# Patient Record
Sex: MALE | Race: BLACK OR AFRICAN AMERICAN | NOT HISPANIC OR LATINO | URBAN - METROPOLITAN AREA
[De-identification: names, ages, dates, MRNs, and addresses within clinical notes are randomized per-mention and may not be internally consistent; named-entity substitution may affect disease eponyms.]

---

## 2022-06-25 ENCOUNTER — INPATIENT (INPATIENT)
Facility: HOSPITAL | Age: 22
LOS: 1 days | Discharge: HOME | End: 2022-06-27
Attending: PLASTIC SURGERY | Admitting: PLASTIC SURGERY

## 2022-06-25 VITALS
DIASTOLIC BLOOD PRESSURE: 74 MMHG | OXYGEN SATURATION: 100 % | TEMPERATURE: 98 F | RESPIRATION RATE: 18 BRPM | SYSTOLIC BLOOD PRESSURE: 136 MMHG | WEIGHT: 130.07 LBS | HEART RATE: 84 BPM

## 2022-06-25 LAB
ALBUMIN SERPL ELPH-MCNC: 5.2 G/DL — SIGNIFICANT CHANGE UP (ref 3.5–5.2)
ALP SERPL-CCNC: 73 U/L — SIGNIFICANT CHANGE UP (ref 30–115)
ALT FLD-CCNC: 12 U/L — SIGNIFICANT CHANGE UP (ref 0–41)
ANION GAP SERPL CALC-SCNC: 14 MMOL/L — SIGNIFICANT CHANGE UP (ref 7–14)
AST SERPL-CCNC: 25 U/L — SIGNIFICANT CHANGE UP (ref 0–41)
BASOPHILS # BLD AUTO: 0.05 K/UL — SIGNIFICANT CHANGE UP (ref 0–0.2)
BASOPHILS NFR BLD AUTO: 0.5 % — SIGNIFICANT CHANGE UP (ref 0–1)
BILIRUB SERPL-MCNC: 0.4 MG/DL — SIGNIFICANT CHANGE UP (ref 0.2–1.2)
BUN SERPL-MCNC: 10 MG/DL — SIGNIFICANT CHANGE UP (ref 10–20)
CALCIUM SERPL-MCNC: 9.8 MG/DL — SIGNIFICANT CHANGE UP (ref 8.5–10.1)
CHLORIDE SERPL-SCNC: 100 MMOL/L — SIGNIFICANT CHANGE UP (ref 98–110)
CO2 SERPL-SCNC: 26 MMOL/L — SIGNIFICANT CHANGE UP (ref 17–32)
CREAT SERPL-MCNC: 0.8 MG/DL — SIGNIFICANT CHANGE UP (ref 0.7–1.5)
EGFR: 128 ML/MIN/1.73M2 — SIGNIFICANT CHANGE UP
EOSINOPHIL # BLD AUTO: 0.01 K/UL — SIGNIFICANT CHANGE UP (ref 0–0.7)
EOSINOPHIL NFR BLD AUTO: 0.1 % — SIGNIFICANT CHANGE UP (ref 0–8)
GLUCOSE SERPL-MCNC: 93 MG/DL — SIGNIFICANT CHANGE UP (ref 70–99)
HCT VFR BLD CALC: 42.5 % — SIGNIFICANT CHANGE UP (ref 42–52)
HGB BLD-MCNC: 12.9 G/DL — LOW (ref 14–18)
IMM GRANULOCYTES NFR BLD AUTO: 0.2 % — SIGNIFICANT CHANGE UP (ref 0.1–0.3)
LYMPHOCYTES # BLD AUTO: 2.84 K/UL — SIGNIFICANT CHANGE UP (ref 1.2–3.4)
LYMPHOCYTES # BLD AUTO: 25.7 % — SIGNIFICANT CHANGE UP (ref 20.5–51.1)
MCHC RBC-ENTMCNC: 21.7 PG — LOW (ref 27–31)
MCHC RBC-ENTMCNC: 30.4 G/DL — LOW (ref 32–37)
MCV RBC AUTO: 71.5 FL — LOW (ref 80–94)
MONOCYTES # BLD AUTO: 0.55 K/UL — SIGNIFICANT CHANGE UP (ref 0.1–0.6)
MONOCYTES NFR BLD AUTO: 5 % — SIGNIFICANT CHANGE UP (ref 1.7–9.3)
NEUTROPHILS # BLD AUTO: 7.6 K/UL — HIGH (ref 1.4–6.5)
NEUTROPHILS NFR BLD AUTO: 68.5 % — SIGNIFICANT CHANGE UP (ref 42.2–75.2)
NRBC # BLD: 0 /100 WBCS — SIGNIFICANT CHANGE UP (ref 0–0)
PLATELET # BLD AUTO: 212 K/UL — SIGNIFICANT CHANGE UP (ref 130–400)
POTASSIUM SERPL-MCNC: 4.1 MMOL/L — SIGNIFICANT CHANGE UP (ref 3.5–5)
POTASSIUM SERPL-SCNC: 4.1 MMOL/L — SIGNIFICANT CHANGE UP (ref 3.5–5)
PROT SERPL-MCNC: 7.5 G/DL — SIGNIFICANT CHANGE UP (ref 6–8)
RBC # BLD: 5.94 M/UL — SIGNIFICANT CHANGE UP (ref 4.7–6.1)
RBC # FLD: 14.6 % — HIGH (ref 11.5–14.5)
SARS-COV-2 RNA SPEC QL NAA+PROBE: SIGNIFICANT CHANGE UP
SODIUM SERPL-SCNC: 140 MMOL/L — SIGNIFICANT CHANGE UP (ref 135–146)
WBC # BLD: 11.07 K/UL — HIGH (ref 4.8–10.8)
WBC # FLD AUTO: 11.07 K/UL — HIGH (ref 4.8–10.8)

## 2022-06-25 PROCEDURE — 99232 SBSQ HOSP IP/OBS MODERATE 35: CPT | Mod: 25

## 2022-06-25 PROCEDURE — 99283 EMERGENCY DEPT VISIT LOW MDM: CPT

## 2022-06-25 PROCEDURE — 93010 ELECTROCARDIOGRAM REPORT: CPT

## 2022-06-25 PROCEDURE — 16020 DRESS/DEBRID P-THICK BURN S: CPT

## 2022-06-25 RX ORDER — NAFCILLIN 10 G/100ML
1 INJECTION, POWDER, FOR SOLUTION INTRAVENOUS EVERY 6 HOURS
Refills: 0 | Status: DISCONTINUED | OUTPATIENT
Start: 2022-06-25 | End: 2022-06-27

## 2022-06-25 RX ORDER — OFLOXACIN 0.3 %
1 DROPS OPHTHALMIC (EYE) ONCE
Refills: 0 | Status: COMPLETED | OUTPATIENT
Start: 2022-06-25 | End: 2022-06-25

## 2022-06-25 RX ORDER — INFLUENZA VIRUS VACCINE 15; 15; 15; 15 UG/.5ML; UG/.5ML; UG/.5ML; UG/.5ML
0.5 SUSPENSION INTRAMUSCULAR ONCE
Refills: 0 | Status: DISCONTINUED | OUTPATIENT
Start: 2022-06-25 | End: 2022-06-27

## 2022-06-25 RX ORDER — ACETAMINOPHEN 500 MG
650 TABLET ORAL EVERY 6 HOURS
Refills: 0 | Status: DISCONTINUED | OUTPATIENT
Start: 2022-06-25 | End: 2022-06-27

## 2022-06-25 RX ORDER — NAFCILLIN 10 G/100ML
1 INJECTION, POWDER, FOR SOLUTION INTRAVENOUS ONCE
Refills: 0 | Status: COMPLETED | OUTPATIENT
Start: 2022-06-25 | End: 2022-06-25

## 2022-06-25 RX ORDER — PREDNISOLONE SODIUM PHOSPHATE 1 %
1 DROPS OPHTHALMIC (EYE)
Refills: 0 | Status: DISCONTINUED | OUTPATIENT
Start: 2022-06-25 | End: 2022-06-27

## 2022-06-25 RX ORDER — ERYTHROMYCIN BASE 5 MG/GRAM
1 OINTMENT (GRAM) OPHTHALMIC (EYE) THREE TIMES A DAY
Refills: 0 | Status: DISCONTINUED | OUTPATIENT
Start: 2022-06-25 | End: 2022-06-27

## 2022-06-25 RX ORDER — ENOXAPARIN SODIUM 100 MG/ML
40 INJECTION SUBCUTANEOUS EVERY 24 HOURS
Refills: 0 | Status: DISCONTINUED | OUTPATIENT
Start: 2022-06-25 | End: 2022-06-27

## 2022-06-25 RX ORDER — PANTOPRAZOLE SODIUM 20 MG/1
40 TABLET, DELAYED RELEASE ORAL
Refills: 0 | Status: DISCONTINUED | OUTPATIENT
Start: 2022-06-25 | End: 2022-06-27

## 2022-06-25 RX ORDER — SODIUM CHLORIDE 9 MG/ML
1000 INJECTION, SOLUTION INTRAVENOUS
Refills: 0 | Status: DISCONTINUED | OUTPATIENT
Start: 2022-06-25 | End: 2022-06-25

## 2022-06-25 RX ORDER — OFLOXACIN 0.3 %
1 DROPS OPHTHALMIC (EYE)
Refills: 0 | Status: DISCONTINUED | OUTPATIENT
Start: 2022-06-25 | End: 2022-06-27

## 2022-06-25 RX ORDER — NAFCILLIN 10 G/100ML
INJECTION, POWDER, FOR SOLUTION INTRAVENOUS
Refills: 0 | Status: DISCONTINUED | OUTPATIENT
Start: 2022-06-25 | End: 2022-06-27

## 2022-06-25 RX ORDER — BACITRACIN ZINC AND POLYMYXIN B SULFATE 500; 10000 [USP'U]/G; [USP'U]/G
1 OINTMENT OPHTHALMIC EVERY 6 HOURS
Refills: 0 | Status: DISCONTINUED | OUTPATIENT
Start: 2022-06-25 | End: 2022-06-27

## 2022-06-25 RX ORDER — KETOROLAC TROMETHAMINE 30 MG/ML
15 SYRINGE (ML) INJECTION EVERY 6 HOURS
Refills: 0 | Status: DISCONTINUED | OUTPATIENT
Start: 2022-06-25 | End: 2022-06-27

## 2022-06-25 RX ORDER — ERYTHROMYCIN BASE 5 MG/GRAM
1 OINTMENT (GRAM) OPHTHALMIC (EYE) EVERY 6 HOURS
Refills: 0 | Status: DISCONTINUED | OUTPATIENT
Start: 2022-06-25 | End: 2022-06-25

## 2022-06-25 RX ADMIN — Medication 1 APPLICATION(S): at 21:53

## 2022-06-25 RX ADMIN — Medication 1 DROP(S): at 15:20

## 2022-06-25 RX ADMIN — PANTOPRAZOLE SODIUM 40 MILLIGRAM(S): 20 TABLET, DELAYED RELEASE ORAL at 07:26

## 2022-06-25 RX ADMIN — Medication 1 DROP(S): at 20:16

## 2022-06-25 RX ADMIN — Medication 1 DROP(S): at 17:02

## 2022-06-25 RX ADMIN — Medication 1 DROP(S): at 06:10

## 2022-06-25 RX ADMIN — Medication 1 APPLICATION(S): at 21:58

## 2022-06-25 RX ADMIN — NAFCILLIN 100 GRAM(S): 10 INJECTION, POWDER, FOR SOLUTION INTRAVENOUS at 17:05

## 2022-06-25 RX ADMIN — Medication 1 DROP(S): at 11:03

## 2022-06-25 RX ADMIN — Medication 1 DROP(S): at 21:58

## 2022-06-25 RX ADMIN — BACITRACIN ZINC AND POLYMYXIN B SULFATE 1 APPLICATION(S): 500; 10000 OINTMENT OPHTHALMIC at 12:43

## 2022-06-25 RX ADMIN — Medication 1 DROP(S): at 02:30

## 2022-06-25 RX ADMIN — BACITRACIN ZINC AND POLYMYXIN B SULFATE 1 APPLICATION(S): 500; 10000 OINTMENT OPHTHALMIC at 06:10

## 2022-06-25 RX ADMIN — Medication 1 DROP(S): at 01:51

## 2022-06-25 RX ADMIN — Medication 1 DROP(S): at 09:44

## 2022-06-25 RX ADMIN — Medication 1 DROP(S): at 12:42

## 2022-06-25 RX ADMIN — NAFCILLIN 100 GRAM(S): 10 INJECTION, POWDER, FOR SOLUTION INTRAVENOUS at 11:00

## 2022-06-25 RX ADMIN — Medication 1 DROP(S): at 06:16

## 2022-06-25 RX ADMIN — NAFCILLIN 100 GRAM(S): 10 INJECTION, POWDER, FOR SOLUTION INTRAVENOUS at 03:39

## 2022-06-25 RX ADMIN — Medication 1 APPLICATION(S): at 11:01

## 2022-06-25 RX ADMIN — Medication 1 DROP(S): at 04:00

## 2022-06-25 RX ADMIN — ENOXAPARIN SODIUM 40 MILLIGRAM(S): 100 INJECTION SUBCUTANEOUS at 12:42

## 2022-06-25 RX ADMIN — Medication 1 APPLICATION(S): at 18:39

## 2022-06-25 RX ADMIN — Medication 1 DROP(S): at 11:00

## 2022-06-25 RX ADMIN — Medication 1 APPLICATION(S): at 06:32

## 2022-06-25 RX ADMIN — Medication 1 APPLICATION(S): at 15:20

## 2022-06-25 RX ADMIN — NAFCILLIN 100 GRAM(S): 10 INJECTION, POWDER, FOR SOLUTION INTRAVENOUS at 21:55

## 2022-06-25 RX ADMIN — Medication 1 DROP(S): at 18:37

## 2022-06-25 RX ADMIN — Medication 1 APPLICATION(S): at 09:00

## 2022-06-25 RX ADMIN — Medication 1 DROP(S): at 18:26

## 2022-06-25 RX ADMIN — BACITRACIN ZINC AND POLYMYXIN B SULFATE 1 APPLICATION(S): 500; 10000 OINTMENT OPHTHALMIC at 18:38

## 2022-06-25 RX ADMIN — SODIUM CHLORIDE 75 MILLILITER(S): 9 INJECTION, SOLUTION INTRAVENOUS at 02:36

## 2022-06-25 NOTE — PATIENT PROFILE ADULT - FALL HARM RISK - HARM RISK INTERVENTIONS
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
## 2022-06-25 NOTE — ED PROVIDER NOTE - NS ED ROS FT
Review of Systems:  	•	CONSTITUTIONAL - no fever, no diaphoresis, no chills  	•	SKIN - +burn, no rash  	•	HEMATOLOGIC - no bleeding, no bruising  	•	EYES - no eye pain, no blurry vision  	•	ENT - no congestion  	•	RESPIRATORY - no shortness of breath, no cough  	•	CARDIAC - no chest pain, no palpitations  	•	GI - no abd pain, no nausea, no vomiting, no diarrhea, no constipation  	•	GENITO-URINARY - no dysuria; no hematuria, no increased urinary frequency  	•	MUSCULOSKELETAL - no joint paint, no swelling, no redness  	•	NEUROLOGIC - no weakness, no headache, no paresthesias, no LOC  	•	PSYCH - no anxiety, no depression  	All other ROS are negative except as documented in HPI.

## 2022-06-25 NOTE — H&P ADULT - NSHPLABSRESULTS_GEN_ALL_CORE
Vital Signs Last 24 Hrs  T(C): 36.8 (25 Jun 2022 00:34), Max: 36.8 (25 Jun 2022 00:34)  T(F): 98.2 (25 Jun 2022 00:34), Max: 98.2 (25 Jun 2022 00:34)  HR: 84 (25 Jun 2022 00:34) (84 - 84)  BP: 136/74 (25 Jun 2022 00:34) (136/74 - 136/74)  RR: 18 (25 Jun 2022 00:34) (18 - 18)  SpO2: 100% (25 Jun 2022 00:34) (100% - 100%)
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
## 2022-06-26 ENCOUNTER — TRANSCRIPTION ENCOUNTER (OUTPATIENT)
Age: 22
End: 2022-06-26

## 2022-06-26 LAB
CK MB CFR SERPL CALC: 1.2 NG/ML — SIGNIFICANT CHANGE UP (ref 0.6–6.3)
CK MB CFR SERPL CALC: 1.3 NG/ML — SIGNIFICANT CHANGE UP (ref 0.6–6.3)
CK SERPL-CCNC: 155 U/L — SIGNIFICANT CHANGE UP (ref 0–225)
CK SERPL-CCNC: 173 U/L — SIGNIFICANT CHANGE UP (ref 0–225)
TROPONIN T SERPL-MCNC: <0.01 NG/ML — SIGNIFICANT CHANGE UP
TROPONIN T SERPL-MCNC: <0.01 NG/ML — SIGNIFICANT CHANGE UP

## 2022-06-26 PROCEDURE — 99232 SBSQ HOSP IP/OBS MODERATE 35: CPT

## 2022-06-26 RX ADMIN — Medication 1 DROP(S): at 10:48

## 2022-06-26 RX ADMIN — Medication 1 DROP(S): at 22:32

## 2022-06-26 RX ADMIN — BACITRACIN ZINC AND POLYMYXIN B SULFATE 1 APPLICATION(S): 500; 10000 OINTMENT OPHTHALMIC at 18:32

## 2022-06-26 RX ADMIN — Medication 1 DROP(S): at 12:12

## 2022-06-26 RX ADMIN — Medication 1 DROP(S): at 16:05

## 2022-06-26 RX ADMIN — Medication 1 DROP(S): at 18:31

## 2022-06-26 RX ADMIN — Medication 1 DROP(S): at 22:31

## 2022-06-26 RX ADMIN — Medication 1 DROP(S): at 12:13

## 2022-06-26 RX ADMIN — NAFCILLIN 100 GRAM(S): 10 INJECTION, POWDER, FOR SOLUTION INTRAVENOUS at 22:30

## 2022-06-26 RX ADMIN — Medication 1 DROP(S): at 10:49

## 2022-06-26 RX ADMIN — Medication 1 DROP(S): at 08:12

## 2022-06-26 RX ADMIN — NAFCILLIN 100 GRAM(S): 10 INJECTION, POWDER, FOR SOLUTION INTRAVENOUS at 12:10

## 2022-06-26 RX ADMIN — Medication 1 APPLICATION(S): at 22:31

## 2022-06-26 RX ADMIN — Medication 1 DROP(S): at 13:43

## 2022-06-26 RX ADMIN — ENOXAPARIN SODIUM 40 MILLIGRAM(S): 100 INJECTION SUBCUTANEOUS at 12:11

## 2022-06-26 RX ADMIN — Medication 1 APPLICATION(S): at 10:50

## 2022-06-26 RX ADMIN — Medication 1 DROP(S): at 18:33

## 2022-06-26 RX ADMIN — BACITRACIN ZINC AND POLYMYXIN B SULFATE 1 APPLICATION(S): 500; 10000 OINTMENT OPHTHALMIC at 05:51

## 2022-06-26 RX ADMIN — NAFCILLIN 100 GRAM(S): 10 INJECTION, POWDER, FOR SOLUTION INTRAVENOUS at 05:57

## 2022-06-26 RX ADMIN — Medication 1 DROP(S): at 05:50

## 2022-06-26 RX ADMIN — Medication 1 DROP(S): at 00:35

## 2022-06-26 RX ADMIN — Medication 1 DROP(S): at 05:56

## 2022-06-26 RX ADMIN — Medication 1 APPLICATION(S): at 05:56

## 2022-06-26 RX ADMIN — Medication 1 APPLICATION(S): at 13:40

## 2022-06-26 RX ADMIN — BACITRACIN ZINC AND POLYMYXIN B SULFATE 1 APPLICATION(S): 500; 10000 OINTMENT OPHTHALMIC at 00:35

## 2022-06-26 RX ADMIN — Medication 1 DROP(S): at 20:15

## 2022-06-26 RX ADMIN — Medication 1 DROP(S): at 16:04

## 2022-06-26 RX ADMIN — BACITRACIN ZINC AND POLYMYXIN B SULFATE 1 APPLICATION(S): 500; 10000 OINTMENT OPHTHALMIC at 12:13

## 2022-06-26 RX ADMIN — Medication 1 APPLICATION(S): at 18:33

## 2022-06-26 RX ADMIN — PANTOPRAZOLE SODIUM 40 MILLIGRAM(S): 20 TABLET, DELAYED RELEASE ORAL at 06:07

## 2022-06-26 RX ADMIN — Medication 1 APPLICATION(S): at 06:07

## 2022-06-26 RX ADMIN — Medication 1 DROP(S): at 06:07

## 2022-06-26 RX ADMIN — NAFCILLIN 100 GRAM(S): 10 INJECTION, POWDER, FOR SOLUTION INTRAVENOUS at 16:04

## 2022-06-26 NOTE — DISCHARGE NOTE PROVIDER - NSDCMRMEDTOKEN_GEN_ALL_CORE_FT
bacitracin-polymyxin B 500 units-10,000 units/g ophthalmic ointment: 1 application in the left upper &amp; lower eyelid 2 times a day   ocular lubricant ophthalmic solution: 1 drop(s) in the left eye 4 times a day   prednisoLONE acetate 1% ophthalmic suspension: 1 drop(s) in the left eye 2 times a day   silver sulfADIAZINE 1% topical cream: 1 application topically 2 times a day

## 2022-06-26 NOTE — DISCHARGE NOTE PROVIDER - NSDCFUSCHEDAPPT_GEN_ALL_CORE_FT
[facility]  OPHTHALM  [address] [place] [address]  Scheduled Appointment: 07/05/2022
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
## 2022-06-26 NOTE — DISCHARGE NOTE PROVIDER - NSFOLLOWUPCLINICS_GEN_ALL_ED_FT
[facility]  Burn  [address], [address]  [place] [address]  Phone: [phone]  Fax:     [facility]  Ophthalmolgy  [address], [address]  [place] [address]  Phone: [phone]  Fax:

## 2022-06-26 NOTE — DISCHARGE NOTE PROVIDER - NSDCCPCAREPLAN_GEN_ALL_CORE_FT
PRINCIPAL DISCHARGE DIAGNOSIS  Diagnosis: Burn  Assessment and Plan of Treatment:       SECONDARY DISCHARGE DIAGNOSES  Diagnosis: Corneal abrasion  Assessment and Plan of Treatment:      PRINCIPAL DISCHARGE DIAGNOSIS  Diagnosis: Burn  Assessment and Plan of Treatment: Continue local wound care twice a day to burn on left hand: wash with soap and water, apply silvadene, cover with nonstick gauze (adaptic) or a large bandaid, wrap with gauze. Call [phone] to make a burn clinic follow up appointment within 1 week of discharge.      SECONDARY DISCHARGE DIAGNOSES  Diagnosis: Iritis of left eye  Assessment and Plan of Treatment: Please continue to apply eye drops/ointments to the left eye only per the eye doctor's reccomendations which are as follows:  artificial tears (preservative free) Ophthalmic Solution 1 Drop(s) left eye 4 times a day  bacitracin/polymyxin B Ophthalmic Ointment 1 Application to left upper and lower lid two times a day.     prednisoLONE acetate 1% Suspension 1 Drop in left eye 2 times a day for 1 week   Patient to follow-up in the eye clinic in 1 week, appointment information provided to patient        PRINCIPAL DISCHARGE DIAGNOSIS  Diagnosis: Burn  Assessment and Plan of Treatment: Continue local wound care twice a day to burn on left hand: wash with soap and water, apply silvadene, cover with nonstick gauze (adaptic) or a large bandaid, wrap with gauze. Call [phone] to make a burn clinic follow up appointment within 1 week of discharge.      SECONDARY DISCHARGE DIAGNOSES  Diagnosis: Iritis of left eye  Assessment and Plan of Treatment: Please continue to apply eye drops/ointments to the left eye only per the eye doctor's reccomendations which are as follows:  artificial tears (preservative free) Ophthalmic Solution 1 Drop(s) left eye 4 times a day  bacitracin/polymyxin B Ophthalmic Ointment 1 Application to left upper and lower lid two times a day.     prednisoLONE acetate 1% Suspension 1 Drop in left eye 2 times a day for 1 week   Patient to follow-up in the eye clinic in 1 week, appointment information provided to patient       Diagnosis: Abnormal EKG  Assessment and Plan of Treatment: You had an abnormal EKG, cardiology was consulted and work up was negative, likely secondary to early repolarization. You had an echo done was was negative results were as follows:  1. Left ventricular ejection fraction, by visual estimation, is 60 to 65%. 2. Normal left ventricular size and wall thicknesses, with normal systolic and diastolic function.  Cardiac enzymes were negative. No symptoms while admitted.  Any future symptoms of chest pain or SOB, etc, please call 911 or go to your nearest emergency room.

## 2022-06-26 NOTE — DISCHARGE NOTE PROVIDER - CARE PROVIDER_API CALL
Lee Tomlinson)  Plastic Surgery  [address] [place] [address]  Phone: [phone]  Fax: [phone]  Established Patient  Follow Up Time: 1 week
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
## 2022-06-27 ENCOUNTER — TRANSCRIPTION ENCOUNTER (OUTPATIENT)
Age: 22
End: 2022-06-27

## 2022-06-27 ENCOUNTER — APPOINTMENT (OUTPATIENT)
Dept: OPHTHALMOLOGY | Facility: CLINIC | Age: 22
End: 2022-06-27

## 2022-06-27 ENCOUNTER — OUTPATIENT (OUTPATIENT)
Dept: OUTPATIENT SERVICES | Facility: HOSPITAL | Age: 22
LOS: 1 days | Discharge: HOME | End: 2022-06-27

## 2022-06-27 VITALS
TEMPERATURE: 98 F | HEART RATE: 60 BPM | SYSTOLIC BLOOD PRESSURE: 120 MMHG | RESPIRATION RATE: 20 BRPM | OXYGEN SATURATION: 100 %

## 2022-06-27 PROBLEM — J45.909 UNSPECIFIED ASTHMA, UNCOMPLICATED: Chronic | Status: ACTIVE | Noted: 2022-06-25

## 2022-06-27 PROCEDURE — 99254 IP/OBS CNSLTJ NEW/EST MOD 60: CPT

## 2022-06-27 PROCEDURE — 93306 TTE W/DOPPLER COMPLETE: CPT | Mod: 26

## 2022-06-27 PROCEDURE — 99233 SBSQ HOSP IP/OBS HIGH 50: CPT

## 2022-06-27 PROCEDURE — 99252 IP/OBS CONSLTJ NEW/EST SF 35: CPT

## 2022-06-27 PROCEDURE — 99238 HOSP IP/OBS DSCHRG MGMT 30/<: CPT

## 2022-06-27 PROCEDURE — 99221 1ST HOSP IP/OBS SF/LOW 40: CPT

## 2022-06-27 RX ORDER — PREDNISOLONE SODIUM PHOSPHATE 1 %
1 DROPS OPHTHALMIC (EYE)
Refills: 0 | Status: DISCONTINUED | OUTPATIENT
Start: 2022-06-27 | End: 2022-06-27

## 2022-06-27 RX ORDER — PREDNISOLONE SODIUM PHOSPHATE 1 %
1 DROPS OPHTHALMIC (EYE)
Qty: 1 | Refills: 1
Start: 2022-06-27 | End: 2022-07-10

## 2022-06-27 RX ORDER — BACITRACIN ZINC AND POLYMYXIN B SULFATE 500; 10000 [USP'U]/G; [USP'U]/G
1 OINTMENT OPHTHALMIC
Refills: 0 | Status: DISCONTINUED | OUTPATIENT
Start: 2022-06-27 | End: 2022-06-27

## 2022-06-27 RX ORDER — BACITRACIN ZINC AND POLYMYXIN B SULFATE 500; 10000 [USP'U]/G; [USP'U]/G
1 OINTMENT OPHTHALMIC
Qty: 1 | Refills: 2
Start: 2022-06-27 | End: 2022-07-17

## 2022-06-27 RX ADMIN — Medication 1 DROP(S): at 00:10

## 2022-06-27 RX ADMIN — BACITRACIN ZINC AND POLYMYXIN B SULFATE 1 APPLICATION(S): 500; 10000 OINTMENT OPHTHALMIC at 13:31

## 2022-06-27 RX ADMIN — ENOXAPARIN SODIUM 40 MILLIGRAM(S): 100 INJECTION SUBCUTANEOUS at 13:30

## 2022-06-27 RX ADMIN — Medication 1 DROP(S): at 10:38

## 2022-06-27 RX ADMIN — NAFCILLIN 100 GRAM(S): 10 INJECTION, POWDER, FOR SOLUTION INTRAVENOUS at 17:04

## 2022-06-27 RX ADMIN — Medication 1 DROP(S): at 05:26

## 2022-06-27 RX ADMIN — Medication 1 DROP(S): at 05:27

## 2022-06-27 RX ADMIN — Medication 1 DROP(S): at 07:51

## 2022-06-27 RX ADMIN — BACITRACIN ZINC AND POLYMYXIN B SULFATE 1 APPLICATION(S): 500; 10000 OINTMENT OPHTHALMIC at 17:05

## 2022-06-27 RX ADMIN — Medication 1 APPLICATION(S): at 05:26

## 2022-06-27 RX ADMIN — Medication 1 DROP(S): at 10:28

## 2022-06-27 RX ADMIN — BACITRACIN ZINC AND POLYMYXIN B SULFATE 1 APPLICATION(S): 500; 10000 OINTMENT OPHTHALMIC at 05:26

## 2022-06-27 RX ADMIN — PANTOPRAZOLE SODIUM 40 MILLIGRAM(S): 20 TABLET, DELAYED RELEASE ORAL at 05:27

## 2022-06-27 RX ADMIN — Medication 1 APPLICATION(S): at 13:30

## 2022-06-27 RX ADMIN — NAFCILLIN 100 GRAM(S): 10 INJECTION, POWDER, FOR SOLUTION INTRAVENOUS at 05:26

## 2022-06-27 RX ADMIN — Medication 1 DROP(S): at 17:05

## 2022-06-27 RX ADMIN — NAFCILLIN 100 GRAM(S): 10 INJECTION, POWDER, FOR SOLUTION INTRAVENOUS at 13:30

## 2022-06-27 RX ADMIN — Medication 1 DROP(S): at 00:11

## 2022-06-27 RX ADMIN — Medication 1 DROP(S): at 13:39

## 2022-06-27 RX ADMIN — Medication 1 APPLICATION(S): at 05:27

## 2022-06-27 RX ADMIN — Medication 1 DROP(S): at 13:31

## 2022-06-27 RX ADMIN — BACITRACIN ZINC AND POLYMYXIN B SULFATE 1 APPLICATION(S): 500; 10000 OINTMENT OPHTHALMIC at 00:10

## 2022-06-27 RX ADMIN — Medication 1 DROP(S): at 13:32

## 2022-06-27 RX ADMIN — Medication 1 APPLICATION(S): at 10:38

## 2022-06-27 NOTE — DISCHARGE NOTE NURSING/CASE MANAGEMENT/SOCIAL WORK - NSDCPEFALRISK_GEN_ALL_CORE
For information on Fall & Injury Prevention, visit: [email or web address] OR  [email or web address] OR  https://www.cdc.gov/steadi/patient.html
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
## 2022-06-27 NOTE — CONSULT NOTE ADULT - ATTENDING COMMENTS
ECG with repolarization changes. Patient denies symptoms of pericarditis, no pericardial effusion on TTE. Also denies personal history of chest pain, SOB, or palpitations. No personal history of loss of consciousness. No family history of sudden cardiac death.     ECG findings are benign and do not require outpatient follow-up. Cardiology consult team to sign off.    Please contact me with any questions or concerns.    Carol Villar MD  Office: [phone]  Mobile: [phone]
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
## 2022-06-27 NOTE — CONSULT NOTE ADULT - ATTENDING SUPERVISION STATEMENT
Patient has received both vaccines for Covid and was tested on 1/20 with negative result at that time.  Dr Yessica zayas with starting treatment since patient is asymptomatic.  Called and informed patient, he will be in for treatment later today.   Fellow
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
## 2022-06-27 NOTE — DISCHARGE NOTE NURSING/CASE MANAGEMENT/SOCIAL WORK - PATIENT PORTAL LINK FT
You can access the FollowMyHealth Patient Portal offered by [facility] by registering at the following website: [email or web address]. By joining [employer]’s FollowMyHealth portal, you will also be able to view your health information using other applications (apps) compatible with our system.
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
## 2022-06-28 DIAGNOSIS — Z02.9 ENCOUNTER FOR ADMINISTRATIVE EXAMINATIONS, UNSPECIFIED: ICD-10-CM
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
## 2022-07-01 DIAGNOSIS — R94.31 ABNORMAL ELECTROCARDIOGRAM [ECG] [EKG]: ICD-10-CM

## 2022-07-01 DIAGNOSIS — T31.0 BURNS INVOLVING LESS THAN 10% OF BODY SURFACE: ICD-10-CM

## 2022-07-01 DIAGNOSIS — T23.262A BURN OF SECOND DEGREE OF BACK OF LEFT HAND, INITIAL ENCOUNTER: ICD-10-CM
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
## 2022-07-01 DIAGNOSIS — S05.02XA INJURY OF CONJUNCTIVA AND CORNEAL ABRASION WITHOUT FOREIGN BODY, LEFT EYE, INITIAL ENCOUNTER: ICD-10-CM

## 2022-07-01 DIAGNOSIS — J45.909 UNSPECIFIED ASTHMA, UNCOMPLICATED: ICD-10-CM

## 2022-07-01 DIAGNOSIS — T20.16XA BURN OF FIRST DEGREE OF FOREHEAD AND CHEEK, INITIAL ENCOUNTER: ICD-10-CM

## 2022-07-05 ENCOUNTER — APPOINTMENT (OUTPATIENT)
Dept: OPHTHALMOLOGY | Facility: CLINIC | Age: 22
End: 2022-07-05

## 2022-07-11 PROBLEM — Z00.00 ENCOUNTER FOR PREVENTIVE HEALTH EXAMINATION: Status: ACTIVE | Noted: 2022-07-11

## 2022-07-12 ENCOUNTER — APPOINTMENT (OUTPATIENT)
Dept: BURN CARE | Facility: CLINIC | Age: 22
End: 2022-07-12
